# Patient Record
Sex: FEMALE | Race: BLACK OR AFRICAN AMERICAN | Employment: PART TIME | URBAN - METROPOLITAN AREA
[De-identification: names, ages, dates, MRNs, and addresses within clinical notes are randomized per-mention and may not be internally consistent; named-entity substitution may affect disease eponyms.]

---

## 2020-04-29 ENCOUNTER — ROUTINE PRENATAL (OUTPATIENT)
Dept: PERINATAL CARE | Age: 35
End: 2020-04-29
Payer: MEDICARE

## 2020-04-29 VITALS
WEIGHT: 170 LBS | SYSTOLIC BLOOD PRESSURE: 98 MMHG | DIASTOLIC BLOOD PRESSURE: 69 MMHG | BODY MASS INDEX: 31.09 KG/M2 | HEART RATE: 102 BPM

## 2020-04-29 PROCEDURE — 76811 OB US DETAILED SNGL FETUS: CPT | Performed by: OBSTETRICS & GYNECOLOGY

## 2020-04-29 NOTE — PROGRESS NOTES
Level II USD and Consultation for Positive AFP screen. Reports+ fetal movement, denies bleeding, leaking of fluid or pain. T-98.6

## 2020-07-22 ENCOUNTER — ROUTINE PRENATAL (OUTPATIENT)
Dept: PERINATAL CARE | Age: 35
End: 2020-07-22
Payer: MEDICARE

## 2020-07-22 VITALS
DIASTOLIC BLOOD PRESSURE: 74 MMHG | BODY MASS INDEX: 33.76 KG/M2 | HEART RATE: 101 BPM | WEIGHT: 184.6 LBS | SYSTOLIC BLOOD PRESSURE: 106 MMHG

## 2020-07-22 PROBLEM — O28.5 POSITIVE RESULT ON MATERNAL SERUM SCREEN FOR TRISOMY 21: Status: ACTIVE | Noted: 2020-07-22

## 2020-07-22 PROBLEM — Z3A.32 32 WEEKS GESTATION OF PREGNANCY: Status: ACTIVE | Noted: 2020-07-22

## 2020-07-22 PROCEDURE — 76816 OB US FOLLOW-UP PER FETUS: CPT

## 2020-07-22 NOTE — PROGRESS NOTES
[facility]    The patient was seen for obstetric ultrasound. Please see the full ultrasound report under the Media tab.     Will Fothergill  7/22/2020

## 2020-07-22 NOTE — PROGRESS NOTES
T-98.5 Growth and anatomy at 32 weeks. Reports + fetal movement, denies bleeding, leaking of fluid. Reports irreg ctx.

## 2020-08-20 ENCOUNTER — ROUTINE PRENATAL (OUTPATIENT)
Dept: PERINATAL CARE | Age: 35
End: 2020-08-20
Payer: MEDICARE

## 2020-08-20 VITALS
DIASTOLIC BLOOD PRESSURE: 82 MMHG | WEIGHT: 186.6 LBS | SYSTOLIC BLOOD PRESSURE: 133 MMHG | HEART RATE: 89 BPM | BODY MASS INDEX: 34.13 KG/M2

## 2020-08-20 PROCEDURE — 76816 OB US FOLLOW-UP PER FETUS: CPT | Performed by: OBSTETRICS & GYNECOLOGY

## 2020-08-20 NOTE — PROGRESS NOTES
F/U growth and anatomy. Reports + fetal movement, denies bleeding, leaking of fluid. Reports irreg ctx.  T-97.6

## 2023-12-02 ENCOUNTER — HOSPITAL ENCOUNTER (OUTPATIENT)
Dept: CT IMAGING | Age: 38
Discharge: HOME | End: 2023-12-02
Attending: OBSTETRICS & GYNECOLOGY
Payer: MEDICAID

## 2023-12-02 ENCOUNTER — HOSPITAL ENCOUNTER (OUTPATIENT)
Dept: ULTRASOUND IMAGING | Age: 38
Discharge: HOME | End: 2023-12-02
Attending: OBSTETRICS & GYNECOLOGY
Payer: MEDICAID

## 2023-12-02 DIAGNOSIS — N80.123 DEEP ENDOMETRIOSIS OF BILATERAL OVARIES: ICD-10-CM

## 2023-12-02 DIAGNOSIS — R10.2 PELVIC PAIN SYNDROME: ICD-10-CM

## 2023-12-02 DIAGNOSIS — K59.00 CONSTIPATION, UNSPECIFIED CONSTIPATION TYPE: ICD-10-CM

## 2023-12-02 DIAGNOSIS — R10.2 PELVIC PAIN IN FEMALE: ICD-10-CM

## 2023-12-02 DIAGNOSIS — R19.5 STOOL MUCUS: ICD-10-CM

## 2023-12-02 PROCEDURE — 74177 CT ABD & PELVIS W/CONTRAST: CPT

## 2023-12-02 PROCEDURE — 6360000004 HC RX CONTRAST MEDICATION: Performed by: OBSTETRICS & GYNECOLOGY

## 2023-12-02 PROCEDURE — 76830 TRANSVAGINAL US NON-OB: CPT

## 2023-12-02 PROCEDURE — 76856 US EXAM PELVIC COMPLETE: CPT

## 2023-12-02 RX ADMIN — IOHEXOL 50 ML: 240 INJECTION, SOLUTION INTRATHECAL; INTRAVASCULAR; INTRAVENOUS; ORAL at 08:24

## 2023-12-02 RX ADMIN — IOPAMIDOL 75 ML: 755 INJECTION, SOLUTION INTRAVENOUS at 08:25
